# Patient Record
Sex: MALE | Race: WHITE | NOT HISPANIC OR LATINO | Employment: FULL TIME | URBAN - METROPOLITAN AREA
[De-identification: names, ages, dates, MRNs, and addresses within clinical notes are randomized per-mention and may not be internally consistent; named-entity substitution may affect disease eponyms.]

---

## 2018-11-04 ENCOUNTER — HOSPITAL ENCOUNTER (OUTPATIENT)
Facility: HOSPITAL | Age: 58
Discharge: HOME | End: 2018-11-04
Attending: EMERGENCY MEDICINE | Admitting: SURGERY
Payer: COMMERCIAL

## 2018-11-04 ENCOUNTER — ANESTHESIA (OUTPATIENT)
Dept: SURGERY | Facility: HOSPITAL | Age: 58
End: 2018-11-04
Payer: COMMERCIAL

## 2018-11-04 ENCOUNTER — ANESTHESIA EVENT (OUTPATIENT)
Dept: SURGERY | Facility: HOSPITAL | Age: 58
End: 2018-11-04
Payer: COMMERCIAL

## 2018-11-04 VITALS
OXYGEN SATURATION: 96 % | RESPIRATION RATE: 20 BRPM | DIASTOLIC BLOOD PRESSURE: 65 MMHG | HEART RATE: 67 BPM | WEIGHT: 217.38 LBS | SYSTOLIC BLOOD PRESSURE: 140 MMHG | BODY MASS INDEX: 27.9 KG/M2 | TEMPERATURE: 97 F | HEIGHT: 74 IN

## 2018-11-04 DIAGNOSIS — K82.8 BILIARY DYSKINESIA: ICD-10-CM

## 2018-11-04 DIAGNOSIS — R10.11 RUQ ABDOMINAL PAIN: ICD-10-CM

## 2018-11-04 DIAGNOSIS — K21.9 GASTROESOPHAGEAL REFLUX DISEASE WITHOUT ESOPHAGITIS: ICD-10-CM

## 2018-11-04 DIAGNOSIS — K80.50 BILIARY COLIC: Primary | ICD-10-CM

## 2018-11-04 PROBLEM — I10 ESSENTIAL HYPERTENSION: Status: ACTIVE | Noted: 2018-11-04

## 2018-11-04 PROBLEM — Z79.4 TYPE 2 DIABETES MELLITUS WITHOUT COMPLICATION, WITH LONG-TERM CURRENT USE OF INSULIN: Status: ACTIVE | Noted: 2018-11-04

## 2018-11-04 PROBLEM — R00.1 SINUS BRADYCARDIA: Status: ACTIVE | Noted: 2018-11-04

## 2018-11-04 PROBLEM — E80.6 HYPERBILIRUBINEMIA: Status: ACTIVE | Noted: 2018-11-04

## 2018-11-04 PROBLEM — E11.9 TYPE 2 DIABETES MELLITUS WITHOUT COMPLICATION, WITH LONG-TERM CURRENT USE OF INSULIN: Status: ACTIVE | Noted: 2018-11-04

## 2018-11-04 LAB
ALBUMIN SERPL BCP-MCNC: 3.8 G/DL
ALP SERPL-CCNC: 78 U/L
ALT SERPL W/O P-5'-P-CCNC: 20 U/L
ANION GAP SERPL CALC-SCNC: 9 MMOL/L
AST SERPL-CCNC: 18 U/L
BACTERIA #/AREA URNS HPF: ABNORMAL /HPF
BASOPHILS # BLD AUTO: 0.02 K/UL
BASOPHILS NFR BLD: 0.3 %
BILIRUB SERPL-MCNC: 1.3 MG/DL
BILIRUB UR QL STRIP: NEGATIVE
BUN SERPL-MCNC: 17 MG/DL
CALCIUM SERPL-MCNC: 9.3 MG/DL
CHLORIDE SERPL-SCNC: 109 MMOL/L
CLARITY UR: CLEAR
CO2 SERPL-SCNC: 19 MMOL/L
COLOR UR: YELLOW
CREAT SERPL-MCNC: 1.1 MG/DL
DIFFERENTIAL METHOD: ABNORMAL
EOSINOPHIL # BLD AUTO: 0.2 K/UL
EOSINOPHIL NFR BLD: 2.8 %
ERYTHROCYTE [DISTWIDTH] IN BLOOD BY AUTOMATED COUNT: 13.5 %
EST. GFR  (AFRICAN AMERICAN): >60 ML/MIN/1.73 M^2
EST. GFR  (NON AFRICAN AMERICAN): >60 ML/MIN/1.73 M^2
GLUCOSE SERPL-MCNC: 274 MG/DL
GLUCOSE UR QL STRIP: ABNORMAL
HCT VFR BLD AUTO: 51.8 %
HGB BLD-MCNC: 17.5 G/DL
HGB UR QL STRIP: NEGATIVE
KETONES UR QL STRIP: ABNORMAL
LEUKOCYTE ESTERASE UR QL STRIP: NEGATIVE
LIPASE SERPL-CCNC: 25 U/L
LYMPHOCYTES # BLD AUTO: 0.7 K/UL
LYMPHOCYTES NFR BLD: 11.4 %
MCH RBC QN AUTO: 30.2 PG
MCHC RBC AUTO-ENTMCNC: 33.8 G/DL
MCV RBC AUTO: 89 FL
MICROSCOPIC COMMENT: ABNORMAL
MONOCYTES # BLD AUTO: 0.8 K/UL
MONOCYTES NFR BLD: 13 %
NEUTROPHILS # BLD AUTO: 4.4 K/UL
NEUTROPHILS NFR BLD: 72 %
NITRITE UR QL STRIP: NEGATIVE
PH UR STRIP: 6 [PH] (ref 5–8)
PLATELET # BLD AUTO: 155 K/UL
PMV BLD AUTO: 10.4 FL
POTASSIUM SERPL-SCNC: 4.4 MMOL/L
PROT SERPL-MCNC: 6.5 G/DL
PROT UR QL STRIP: NEGATIVE
RBC # BLD AUTO: 5.8 M/UL
RBC #/AREA URNS HPF: 1 /HPF (ref 0–4)
SODIUM SERPL-SCNC: 137 MMOL/L
SP GR UR STRIP: 1.01 (ref 1–1.03)
SQUAMOUS #/AREA URNS HPF: 1 /HPF
URN SPEC COLLECT METH UR: ABNORMAL
UROBILINOGEN UR STRIP-ACNC: NEGATIVE EU/DL
WBC # BLD AUTO: 6.07 K/UL
WBC #/AREA URNS HPF: 0 /HPF (ref 0–5)
YEAST URNS QL MICRO: ABNORMAL

## 2018-11-04 PROCEDURE — 63600175 PHARM REV CODE 636 W HCPCS: Performed by: ANESTHESIOLOGY

## 2018-11-04 PROCEDURE — 94761 N-INVAS EAR/PLS OXIMETRY MLT: CPT

## 2018-11-04 PROCEDURE — 27000221 HC OXYGEN, UP TO 24 HOURS

## 2018-11-04 PROCEDURE — 93005 ELECTROCARDIOGRAM TRACING: CPT

## 2018-11-04 PROCEDURE — 63600175 PHARM REV CODE 636 W HCPCS: Performed by: STUDENT IN AN ORGANIZED HEALTH CARE EDUCATION/TRAINING PROGRAM

## 2018-11-04 PROCEDURE — 63600175 PHARM REV CODE 636 W HCPCS: Performed by: SURGERY

## 2018-11-04 PROCEDURE — G0378 HOSPITAL OBSERVATION PER HR: HCPCS

## 2018-11-04 PROCEDURE — 83690 ASSAY OF LIPASE: CPT

## 2018-11-04 PROCEDURE — 36000709 HC OR TIME LEV III EA ADD 15 MIN: Performed by: SURGERY

## 2018-11-04 PROCEDURE — 94799 UNLISTED PULMONARY SVC/PX: CPT

## 2018-11-04 PROCEDURE — 25000003 PHARM REV CODE 250: Performed by: STUDENT IN AN ORGANIZED HEALTH CARE EDUCATION/TRAINING PROGRAM

## 2018-11-04 PROCEDURE — 71000033 HC RECOVERY, INTIAL HOUR: Performed by: SURGERY

## 2018-11-04 PROCEDURE — 80053 COMPREHEN METABOLIC PANEL: CPT

## 2018-11-04 PROCEDURE — 85025 COMPLETE CBC W/AUTO DIFF WBC: CPT

## 2018-11-04 PROCEDURE — 27201423 OPTIME MED/SURG SUP & DEVICES STERILE SUPPLY: Performed by: SURGERY

## 2018-11-04 PROCEDURE — 25000003 PHARM REV CODE 250: Performed by: SURGERY

## 2018-11-04 PROCEDURE — 88304 TISSUE EXAM BY PATHOLOGIST: CPT | Mod: 26,,, | Performed by: PATHOLOGY

## 2018-11-04 PROCEDURE — 96375 TX/PRO/DX INJ NEW DRUG ADDON: CPT

## 2018-11-04 PROCEDURE — S0077 INJECTION, CLINDAMYCIN PHOSP: HCPCS | Performed by: STUDENT IN AN ORGANIZED HEALTH CARE EDUCATION/TRAINING PROGRAM

## 2018-11-04 PROCEDURE — 96374 THER/PROPH/DIAG INJ IV PUSH: CPT

## 2018-11-04 PROCEDURE — 93010 ELECTROCARDIOGRAM REPORT: CPT | Mod: ,,, | Performed by: INTERNAL MEDICINE

## 2018-11-04 PROCEDURE — 88304 TISSUE EXAM BY PATHOLOGIST: CPT | Performed by: PATHOLOGY

## 2018-11-04 PROCEDURE — 63600175 PHARM REV CODE 636 W HCPCS: Performed by: EMERGENCY MEDICINE

## 2018-11-04 PROCEDURE — 37000009 HC ANESTHESIA EA ADD 15 MINS: Performed by: SURGERY

## 2018-11-04 PROCEDURE — 81000 URINALYSIS NONAUTO W/SCOPE: CPT

## 2018-11-04 PROCEDURE — 36000708 HC OR TIME LEV III 1ST 15 MIN: Performed by: SURGERY

## 2018-11-04 PROCEDURE — 25000003 PHARM REV CODE 250: Performed by: EMERGENCY MEDICINE

## 2018-11-04 PROCEDURE — 99285 EMERGENCY DEPT VISIT HI MDM: CPT | Mod: 25

## 2018-11-04 PROCEDURE — 63600175 PHARM REV CODE 636 W HCPCS

## 2018-11-04 PROCEDURE — 37000008 HC ANESTHESIA 1ST 15 MINUTES: Performed by: SURGERY

## 2018-11-04 RX ORDER — GLYCOPYRROLATE 0.2 MG/ML
INJECTION INTRAMUSCULAR; INTRAVENOUS
Status: DISCONTINUED | OUTPATIENT
Start: 2018-11-04 | End: 2018-11-04

## 2018-11-04 RX ORDER — FENTANYL CITRATE 50 UG/ML
INJECTION, SOLUTION INTRAMUSCULAR; INTRAVENOUS
Status: DISCONTINUED | OUTPATIENT
Start: 2018-11-04 | End: 2018-11-04

## 2018-11-04 RX ORDER — ONDANSETRON 2 MG/ML
4 INJECTION INTRAMUSCULAR; INTRAVENOUS
Status: COMPLETED | OUTPATIENT
Start: 2018-11-04 | End: 2018-11-04

## 2018-11-04 RX ORDER — GLIPIZIDE 10 MG/1
10 TABLET ORAL
COMMUNITY

## 2018-11-04 RX ORDER — SODIUM CHLORIDE 0.9 % (FLUSH) 0.9 %
3 SYRINGE (ML) INJECTION
Status: DISCONTINUED | OUTPATIENT
Start: 2018-11-04 | End: 2018-11-04 | Stop reason: HOSPADM

## 2018-11-04 RX ORDER — SODIUM CHLORIDE 0.9 % (FLUSH) 0.9 %
3 SYRINGE (ML) INJECTION EVERY 8 HOURS
Status: DISCONTINUED | OUTPATIENT
Start: 2018-11-04 | End: 2018-11-04 | Stop reason: HOSPADM

## 2018-11-04 RX ORDER — PROPOFOL 10 MG/ML
VIAL (ML) INTRAVENOUS
Status: DISCONTINUED | OUTPATIENT
Start: 2018-11-04 | End: 2018-11-04

## 2018-11-04 RX ORDER — ACETAMINOPHEN 325 MG/1
650 TABLET ORAL EVERY 8 HOURS PRN
Status: DISCONTINUED | OUTPATIENT
Start: 2018-11-04 | End: 2018-11-04 | Stop reason: HOSPADM

## 2018-11-04 RX ORDER — ACETAMINOPHEN 325 MG/1
650 TABLET ORAL EVERY 4 HOURS PRN
Status: DISCONTINUED | OUTPATIENT
Start: 2018-11-04 | End: 2018-11-04 | Stop reason: HOSPADM

## 2018-11-04 RX ORDER — ONDANSETRON 2 MG/ML
4 INJECTION INTRAMUSCULAR; INTRAVENOUS ONCE AS NEEDED
Status: DISCONTINUED | OUTPATIENT
Start: 2018-11-04 | End: 2018-11-04 | Stop reason: HOSPADM

## 2018-11-04 RX ORDER — SODIUM CHLORIDE 9 MG/ML
INJECTION, SOLUTION INTRAVENOUS CONTINUOUS PRN
Status: DISCONTINUED | OUTPATIENT
Start: 2018-11-04 | End: 2018-11-04

## 2018-11-04 RX ORDER — SUCCINYLCHOLINE CHLORIDE 20 MG/ML
INJECTION INTRAMUSCULAR; INTRAVENOUS
Status: DISCONTINUED | OUTPATIENT
Start: 2018-11-04 | End: 2018-11-04

## 2018-11-04 RX ORDER — ROCURONIUM BROMIDE 10 MG/ML
INJECTION, SOLUTION INTRAVENOUS
Status: DISCONTINUED | OUTPATIENT
Start: 2018-11-04 | End: 2018-11-04

## 2018-11-04 RX ORDER — OMEPRAZOLE 40 MG/1
40 CAPSULE, DELAYED RELEASE ORAL DAILY
COMMUNITY

## 2018-11-04 RX ORDER — HYDROMORPHONE HYDROCHLORIDE 2 MG/ML
0.5 INJECTION, SOLUTION INTRAMUSCULAR; INTRAVENOUS; SUBCUTANEOUS EVERY 6 HOURS PRN
Status: DISCONTINUED | OUTPATIENT
Start: 2018-11-04 | End: 2018-11-04 | Stop reason: HOSPADM

## 2018-11-04 RX ORDER — NEBIVOLOL 10 MG/1
10 TABLET ORAL DAILY
COMMUNITY

## 2018-11-04 RX ORDER — HYDROMORPHONE HYDROCHLORIDE 2 MG/ML
0.5 INJECTION, SOLUTION INTRAMUSCULAR; INTRAVENOUS; SUBCUTANEOUS EVERY 5 MIN PRN
Status: DISCONTINUED | OUTPATIENT
Start: 2018-11-04 | End: 2018-11-04 | Stop reason: HOSPADM

## 2018-11-04 RX ORDER — SUCRALFATE 1 G/1
1 TABLET ORAL 4 TIMES DAILY
COMMUNITY

## 2018-11-04 RX ORDER — KETOROLAC TROMETHAMINE 30 MG/ML
INJECTION, SOLUTION INTRAMUSCULAR; INTRAVENOUS
Status: DISCONTINUED | OUTPATIENT
Start: 2018-11-04 | End: 2018-11-04

## 2018-11-04 RX ORDER — METFORMIN HYDROCHLORIDE 500 MG/1
500 TABLET ORAL
COMMUNITY

## 2018-11-04 RX ORDER — HYDRALAZINE HYDROCHLORIDE 20 MG/ML
INJECTION INTRAMUSCULAR; INTRAVENOUS
Status: COMPLETED
Start: 2018-11-04 | End: 2018-11-04

## 2018-11-04 RX ORDER — MIDAZOLAM HYDROCHLORIDE 1 MG/ML
INJECTION, SOLUTION INTRAMUSCULAR; INTRAVENOUS
Status: DISCONTINUED | OUTPATIENT
Start: 2018-11-04 | End: 2018-11-04

## 2018-11-04 RX ORDER — OXYCODONE HYDROCHLORIDE 5 MG/1
5 TABLET ORAL EVERY 4 HOURS PRN
Status: DISCONTINUED | OUTPATIENT
Start: 2018-11-04 | End: 2018-11-04 | Stop reason: HOSPADM

## 2018-11-04 RX ORDER — MORPHINE SULFATE 2 MG/ML
6 INJECTION, SOLUTION INTRAMUSCULAR; INTRAVENOUS
Status: COMPLETED | OUTPATIENT
Start: 2018-11-04 | End: 2018-11-04

## 2018-11-04 RX ORDER — OXYCODONE AND ACETAMINOPHEN 5; 325 MG/1; MG/1
1 TABLET ORAL EVERY 4 HOURS PRN
Qty: 25 TABLET | Refills: 0 | Status: SHIPPED | OUTPATIENT
Start: 2018-11-04

## 2018-11-04 RX ORDER — EPHEDRINE SULFATE 50 MG/ML
INJECTION, SOLUTION INTRAVENOUS
Status: DISCONTINUED | OUTPATIENT
Start: 2018-11-04 | End: 2018-11-04

## 2018-11-04 RX ORDER — HEPARIN SODIUM 5000 [USP'U]/ML
5000 INJECTION, SOLUTION INTRAVENOUS; SUBCUTANEOUS ONCE
Status: COMPLETED | OUTPATIENT
Start: 2018-11-04 | End: 2018-11-04

## 2018-11-04 RX ORDER — NEOSTIGMINE METHYLSULFATE 1 MG/ML
INJECTION, SOLUTION INTRAVENOUS
Status: DISCONTINUED | OUTPATIENT
Start: 2018-11-04 | End: 2018-11-04

## 2018-11-04 RX ORDER — RAMELTEON 8 MG/1
8 TABLET ORAL NIGHTLY PRN
Status: DISCONTINUED | OUTPATIENT
Start: 2018-11-04 | End: 2018-11-04 | Stop reason: HOSPADM

## 2018-11-04 RX ORDER — ONDANSETRON 2 MG/ML
INJECTION INTRAMUSCULAR; INTRAVENOUS
Status: DISCONTINUED | OUTPATIENT
Start: 2018-11-04 | End: 2018-11-04

## 2018-11-04 RX ORDER — ONDANSETRON 8 MG/1
8 TABLET, ORALLY DISINTEGRATING ORAL EVERY 8 HOURS PRN
Status: DISCONTINUED | OUTPATIENT
Start: 2018-11-04 | End: 2018-11-04 | Stop reason: HOSPADM

## 2018-11-04 RX ORDER — BUPIVACAINE HYDROCHLORIDE 2.5 MG/ML
INJECTION, SOLUTION EPIDURAL; INFILTRATION; INTRACAUDAL
Status: DISCONTINUED | OUTPATIENT
Start: 2018-11-04 | End: 2018-11-04 | Stop reason: HOSPADM

## 2018-11-04 RX ORDER — LISINOPRIL 40 MG/1
40 TABLET ORAL DAILY
COMMUNITY

## 2018-11-04 RX ORDER — SIMVASTATIN 20 MG/1
20 TABLET, FILM COATED ORAL NIGHTLY
COMMUNITY

## 2018-11-04 RX ORDER — HYDRALAZINE HYDROCHLORIDE 20 MG/ML
10 INJECTION INTRAMUSCULAR; INTRAVENOUS ONCE
Status: COMPLETED | OUTPATIENT
Start: 2018-11-04 | End: 2018-11-04

## 2018-11-04 RX ORDER — OXYCODONE HYDROCHLORIDE 5 MG/1
10 TABLET ORAL EVERY 4 HOURS PRN
Status: DISCONTINUED | OUTPATIENT
Start: 2018-11-04 | End: 2018-11-04 | Stop reason: HOSPADM

## 2018-11-04 RX ORDER — KETOROLAC TROMETHAMINE 30 MG/ML
15 INJECTION, SOLUTION INTRAMUSCULAR; INTRAVENOUS EVERY 6 HOURS
Status: DISCONTINUED | OUTPATIENT
Start: 2018-11-04 | End: 2018-11-04 | Stop reason: HOSPADM

## 2018-11-04 RX ORDER — CLINDAMYCIN PHOSPHATE 900 MG/50ML
900 INJECTION, SOLUTION INTRAVENOUS
Status: DISCONTINUED | OUTPATIENT
Start: 2018-11-04 | End: 2018-11-04

## 2018-11-04 RX ORDER — LIDOCAINE HCL/PF 100 MG/5ML
SYRINGE (ML) INTRAVENOUS
Status: DISCONTINUED | OUTPATIENT
Start: 2018-11-04 | End: 2018-11-04

## 2018-11-04 RX ORDER — ONDANSETRON 4 MG/1
4 TABLET, ORALLY DISINTEGRATING ORAL EVERY 8 HOURS PRN
Qty: 20 TABLET | Refills: 0 | Status: SHIPPED | OUTPATIENT
Start: 2018-11-04

## 2018-11-04 RX ORDER — ACETAMINOPHEN 10 MG/ML
INJECTION, SOLUTION INTRAVENOUS
Status: DISCONTINUED | OUTPATIENT
Start: 2018-11-04 | End: 2018-11-04

## 2018-11-04 RX ADMIN — FENTANYL CITRATE 50 MCG: 50 INJECTION, SOLUTION INTRAMUSCULAR; INTRAVENOUS at 12:11

## 2018-11-04 RX ADMIN — SODIUM CHLORIDE: 0.9 INJECTION, SOLUTION INTRAVENOUS at 11:11

## 2018-11-04 RX ADMIN — HYDROMORPHONE HYDROCHLORIDE 0.5 MG: 2 INJECTION INTRAMUSCULAR; INTRAVENOUS; SUBCUTANEOUS at 01:11

## 2018-11-04 RX ADMIN — MORPHINE SULFATE 6 MG: 2 INJECTION, SOLUTION INTRAMUSCULAR; INTRAVENOUS at 04:11

## 2018-11-04 RX ADMIN — GLYCOPYRROLATE 0.6 MG: 0.2 INJECTION, SOLUTION INTRAMUSCULAR; INTRAVENOUS at 12:11

## 2018-11-04 RX ADMIN — EPHEDRINE SULFATE 10 MG: 50 INJECTION, SOLUTION INTRAMUSCULAR; INTRAVENOUS; SUBCUTANEOUS at 11:11

## 2018-11-04 RX ADMIN — ROCURONIUM BROMIDE 10 MG: 10 INJECTION, SOLUTION INTRAVENOUS at 12:11

## 2018-11-04 RX ADMIN — ACETAMINOPHEN 1000 MG: 10 INJECTION, SOLUTION INTRAVENOUS at 11:11

## 2018-11-04 RX ADMIN — LIDOCAINE HYDROCHLORIDE 100 MG: 20 INJECTION, SOLUTION INTRAVENOUS at 11:11

## 2018-11-04 RX ADMIN — PROPOFOL 160 MG: 10 INJECTION, EMULSION INTRAVENOUS at 11:11

## 2018-11-04 RX ADMIN — MIDAZOLAM 2 MG: 1 INJECTION INTRAMUSCULAR; INTRAVENOUS at 11:11

## 2018-11-04 RX ADMIN — ROCURONIUM BROMIDE 5 MG: 10 INJECTION, SOLUTION INTRAVENOUS at 11:11

## 2018-11-04 RX ADMIN — FENTANYL CITRATE 150 MCG: 50 INJECTION, SOLUTION INTRAMUSCULAR; INTRAVENOUS at 11:11

## 2018-11-04 RX ADMIN — GLYCOPYRROLATE 0.1 MG: 0.2 INJECTION, SOLUTION INTRAMUSCULAR; INTRAVENOUS at 11:11

## 2018-11-04 RX ADMIN — HYDRALAZINE HYDROCHLORIDE 10 MG: 20 INJECTION INTRAMUSCULAR; INTRAVENOUS at 01:11

## 2018-11-04 RX ADMIN — SUCCINYLCHOLINE CHLORIDE 120 MG: 20 INJECTION, SOLUTION INTRAMUSCULAR; INTRAVENOUS at 11:11

## 2018-11-04 RX ADMIN — KETOROLAC TROMETHAMINE 30 MG: 30 INJECTION, SOLUTION INTRAMUSCULAR; INTRAVENOUS at 12:11

## 2018-11-04 RX ADMIN — HEPARIN SODIUM 5000 UNITS: 5000 INJECTION, SOLUTION INTRAVENOUS; SUBCUTANEOUS at 10:11

## 2018-11-04 RX ADMIN — ONDANSETRON 4 MG: 2 INJECTION INTRAMUSCULAR; INTRAVENOUS at 04:11

## 2018-11-04 RX ADMIN — GLYCOPYRROLATE 0.1 MG: 0.2 INJECTION, SOLUTION INTRAMUSCULAR; INTRAVENOUS at 12:11

## 2018-11-04 RX ADMIN — LIDOCAINE HYDROCHLORIDE: 20 SOLUTION ORAL; TOPICAL at 06:11

## 2018-11-04 RX ADMIN — CLINDAMYCIN PHOSPHATE 900 MG: 18 INJECTION, SOLUTION INTRAVENOUS at 11:11

## 2018-11-04 RX ADMIN — NEOSTIGMINE METHYLSULFATE 3 MG: 1 INJECTION INTRAVENOUS at 12:11

## 2018-11-04 RX ADMIN — ONDANSETRON 4 MG: 2 INJECTION, SOLUTION INTRAMUSCULAR; INTRAVENOUS at 12:11

## 2018-11-04 RX ADMIN — KETOROLAC TROMETHAMINE 15 MG: 30 INJECTION, SOLUTION INTRAMUSCULAR at 05:11

## 2018-11-04 RX ADMIN — ROCURONIUM BROMIDE 25 MG: 10 INJECTION, SOLUTION INTRAVENOUS at 11:11

## 2018-11-04 NOTE — ED NOTES
Report received from ariadna rodriguez patient awake alert in no acute distress surgeon at bedside patient being consented for surgery by dr. Lay patient has no questions at this time patient updated on plan of care nurse will continue to monitor

## 2018-11-04 NOTE — TRANSFER OF CARE
"Anesthesia Transfer of Care Note    Patient: Brandt Sanches    Procedure(s) Performed: Procedure(s) (LRB):  CHOLECYSTECTOMY, LAPAROSCOPIC (N/A)    Patient location: ICU    Anesthesia Type: general    Transport from OR: Transported from OR on 6-10 L/min O2 by face mask with adequate spontaneous ventilation    Post pain: adequate analgesia    Post assessment: no apparent anesthetic complications    Post vital signs: stable    Level of consciousness: awake and alert    Nausea/Vomiting: no nausea/vomiting    Complications: none    Transfer of care protocol was followed      Last vitals:   Visit Vitals  BP (!) 193/86   Pulse (!) 54   Temp 36.3 °C (97.3 °F) (Skin)   Resp 20   Ht 6' 2" (1.88 m)   Wt 98.6 kg (217 lb 6 oz)   SpO2 98%   BMI 27.91 kg/m²     "

## 2018-11-04 NOTE — OP NOTE
[facility]  Surgery Department  Operative Note    SUMMARY     Date of Procedure: 11/4/2018     Procedure: Procedure(s) (LRB):  CHOLECYSTECTOMY, LAPAROSCOPIC (N/A)     Surgeon(s) and Role:     * Marla Geiger MD - Primary    Assisting Surgeon: dr. Mathieu franklin    Pre-Operative Diagnosis: Biliary colic [K80.50]  Biliary dyskinesia [K82.8]    Post-Operative Diagnosis: Post-Op Diagnosis Codes:     * Biliary colic [K80.50]     * Biliary dyskinesia [K82.8]  With acute cholecystitis    Anesthesia: General    Technical Procedures Used: 4 port lap kameron    Description of the Findings of the Procedure: acute cholecystitis  [id number]    Complications: no    Estimated Blood Loss (EBL): minimal         Implants: none  Specimens:   Specimen (12h ago, onward)    Start     Ordered    11/04/18 1248  Specimen to Pathology - Surgery  Once     Comments:  Pre-op Diagnosis: Biliary colic [K80.50]Biliary dyskinesia [K82.8]Post-op Diagnosis: same as preopProcedure(s):CHOLECYSTECTOMY, LAPAROSCOPIC Number of specimens:1Name of specimens: Gallbladder     Start Status   11/04/18 1248 Collected (11/04/18 1248)       11/04/18 1248                  Condition: Stable    Disposition: PACU - hemodynamically stable.    Attestation: I was present and scrubbed for the entire procedure.

## 2018-11-04 NOTE — NURSING
Admit assessment questions completed.  Patient answered all questions appropriately.  Denies any questions, concerns or needs at this time.  Waiting OR for cholecystectomy.  Will continue to monitor.

## 2018-11-04 NOTE — ANESTHESIA PREPROCEDURE EVALUATION
11/04/2018  Brandt Sanches is a 58 y.o., male with pmhx of HTN, GERD, DM2 scheduled for laparoscopic cholecystectomy. Hx of previous GETA for knee arthroplasty in 2012 in [place] with [facility]. NPO since evening PTA    Anesthesia Evaluation    I have reviewed the Patient Summary Reports.    I have reviewed the Nursing Notes.      Review of Systems  Anesthesia Hx:  History of prior surgery of interest to airway management or planning: Denies Family Hx of Anesthesia complications.   Denies Personal Hx of Anesthesia complications.   Social:  Non-Smoker    Hematology/Oncology:  Hematology Normal   Oncology Normal     EENT/Dental:EENT/Dental Normal   Cardiovascular:   Hypertension    Pulmonary:  Pulmonary Normal    Hepatic/GI:   GERD    Neurological:  Neurology Normal    Endocrine:   Diabetes        Physical Exam   Airway/Jaw/Neck:  Airway Findings: Mouth Opening: Normal Tongue: Normal  Mallampati: II  TM Distance: Normal, at least 6 cm  Jaw/Neck Findings:  Neck ROM: Normal ROM      Dental:  Dental Findings: upper partial dentures   Chest/Lungs:  Chest/Lungs Findings: Clear to auscultation     Heart/Vascular:  Heart Findings: Rate: Bradycardia  Rhythm: Regular Rhythm             Anesthesia Plan  Type of Anesthesia, risks & benefits discussed:  Anesthesia Type:  general  Patient's Preference:   Intra-op Monitoring Plan: standard ASA monitors  Intra-op Monitoring Plan Comments:   Post Op Pain Control Plan: multimodal analgesia  Post Op Pain Control Plan Comments:   Induction:   IV  Beta Blocker:  Patient is not currently on a Beta-Blocker (No further documentation required).       Informed Consent: Patient understands risks and agrees with Anesthesia plan.  Questions answered. Anesthesia consent signed with patient.  ASA Score: 2     Day of Surgery Review of History & Physical:            Ready For Surgery From Anesthesia  Perspective.

## 2018-11-04 NOTE — ED NOTES
PT PRESENTS TO ED WITH GENERALIZED ABDOMINAL PAIN AND TENDERNESS THAT IS WORSE IN THE RUQ AND WORSENS AFTER EATING. PT REPORTS +N/V/D. STATES HE HAD A HIDA SCAN RECENTLY AND WAS TOLD HE NEEDED TO HAVE A CHOLECYSTECTOMY AND WAS IN THE PROCESS OF SCHEDULING THE SURGERY, BUT HE CAME FROM OUT OF STATE TO VISIT HIS SON AND PAIN WORSENED AROUND 0000 THIS MORNING. PT REPORTS HE HAD A RECENT GALLBLADDER US BUT THAT ONE WAS NEGATIVE.

## 2018-11-04 NOTE — ED PROVIDER NOTES
Encounter Date: 11/4/2018    SCRIBE #1 NOTE: I, Chelsey Montano, am scribing for, and in the presence of,  Dr. Stephenson. I have scribed the entire note.       History     Chief Complaint   Patient presents with    Abdominal Pain     To ER with c/o nausea, vomiting, diarrhea and abd pain starting at midnight.  pt recently diagnosed with decreased functioning of gallbladder.    Nausea    Diarrhea    Vomiting     A 57 y/o male presents to the ED complaining of RUQ abdominal pain with episodes of emesis and diarrhea that worsened at 2400. Patient from [place], reports recent HIDA scan with EF of 8%. He is scheduled for follow up on 11/15 for evaluation for possible cholecystectomy . Patient describes sharp cramping abdominal pain that is radiating to his back. Abdominal pain worse after eating without any reported allleviating factors since onset. Assocaited symptoms include subjective fever, headache, and sore throat. He denies any dysuria, hematuria, CP, SOB, dizziness, cough, congestion, or any other concerning symptoms.      The history is provided by the patient.     Review of patient's allergies indicates:   Allergen Reactions    Iodine and iodide containing products Nausea And Vomiting    Pcn [penicillins] Rash     Past Medical History:   Diagnosis Date    Diabetes mellitus      Past Surgical History:   Procedure Laterality Date    CARPAL TUNNEL RELEASE Left     CHOLECYSTECTOMY, LAPAROSCOPIC N/A 11/4/2018    Performed by Marla Geiger MD at [facility] OR    LAPAROSCOPIC CHOLECYSTECTOMY N/A 11/4/2018    Procedure: CHOLECYSTECTOMY, LAPAROSCOPIC;  Surgeon: Marla Geiger MD;  Location: [facility] OR;  Service: General;  Laterality: N/A;    TOTAL KNEE ARTHROPLASTY Left      History reviewed. No pertinent family history.  Social History     Tobacco Use    Smoking status: Never Smoker   Substance Use Topics    Alcohol use: No     Frequency: Never    Drug use: No     Review of Systems   Constitutional:  Positive for fever. Negative for chills.   HENT: Positive for sore throat. Negative for congestion, ear pain and rhinorrhea.    Respiratory: Negative for cough, shortness of breath and wheezing.    Cardiovascular: Negative for chest pain and palpitations.   Gastrointestinal: Positive for abdominal pain, diarrhea, nausea and vomiting.   Genitourinary: Negative for dysuria and hematuria.   Musculoskeletal: Positive for back pain. Negative for myalgias and neck pain.   Skin: Negative for rash.   Neurological: Positive for headaches. Negative for dizziness, weakness and light-headedness.   Psychiatric/Behavioral: Negative for confusion.       Physical Exam     Initial Vitals [11/04/18 0354]   BP Pulse Resp Temp SpO2   (!) 159/80 65 18 98.1 °F (36.7 °C) 97 %      MAP       --         Physical Exam    Nursing note and vitals reviewed.  Constitutional: He appears well-developed and well-nourished.  Non-toxic appearance. He does not appear ill. No distress.   HENT:   Head: Normocephalic and atraumatic.   Mouth/Throat: Oropharynx is clear and moist.   Eyes: Conjunctivae and EOM are normal.   Cardiovascular: Normal rate, regular rhythm, normal heart sounds and intact distal pulses.   Pulmonary/Chest: Breath sounds normal. No respiratory distress.   Abdominal: Soft. Normal appearance and bowel sounds are normal. There is tenderness in the right upper quadrant. There is guarding. There is no rebound.   Diffuse tenderness RUQ with voluntary guarding    Musculoskeletal: Normal range of motion. He exhibits no edema or tenderness.   Neurological: He is alert and oriented to person, place, and time. He has normal strength.   Skin: Skin is warm and dry. Capillary refill takes less than 2 seconds.         ED Course   Procedures  Labs Reviewed   CBC W/ AUTO DIFFERENTIAL - Abnormal; Notable for the following components:       Result Value    Lymph # 0.7 (*)     Lymph% 11.4 (*)     All other components within normal limits    COMPREHENSIVE METABOLIC PANEL - Abnormal; Notable for the following components:    CO2 19 (*)     Glucose 274 (*)     Total Bilirubin 1.3 (*)     All other components within normal limits   URINALYSIS, REFLEX TO URINE CULTURE - Abnormal; Notable for the following components:    Glucose, UA 3+ (*)     Ketones, UA Trace (*)     All other components within normal limits    Narrative:     Preferred Collection Type->Urine, Clean Catch   URINALYSIS MICROSCOPIC - Abnormal; Notable for the following components:    Bacteria, UA Few (*)     All other components within normal limits    Narrative:     Preferred Collection Type->Urine, Clean Catch   LIPASE          X-Rays:   Independently Interpreted Readings:   Other Readings:  Reviewed by myself, read by radiology.      Imaging Results          X-Ray Chest 1 View (Final result)  Result time 11/04/18 08:11:36    Final result by Toni Ruiz III, MD (11/04/18 08:11:36)                 Impression:      See above    No acute process seen.      Electronically signed by: Toni Ruiz MD  Date:    11/04/2018  Time:    08:11             Narrative:    EXAMINATION:  XR CHEST 1 VIEW    CLINICAL HISTORY:  pre-op;    FINDINGS:  Heart size is normal.  Lungs are clear.  There is aortic plaque.                               US Abdomen Limited (Final result)  Result time 11/04/18 05:39:33    Final result by Stephie Lou MD (11/04/18 05:39:33)                 Impression:      No significant sonographic abnormality.      Electronically signed by: Stephie Lou MD  Date:    11/04/2018  Time:    05:39             Narrative:    EXAMINATION:  US ABDOMEN LIMITED    CLINICAL HISTORY:  Abdominal Pain - Gallbladder;    TECHNIQUE:  Limited ultrasound of the right upper quadrant of the abdomen (including pancreas, liver, gallbladder, common bile duct, and right kidney) was performed.    COMPARISON:  None.    FINDINGS:  Pancreas: Unremarkable in its visualized extent noting partial obscuration by  overlying bowel gas.    Liver: The liver measures 13.5 cm.  Homogeneous echotexture. No focal hepatic lesions.    Gallbladder: No calculi, wall thickening, or pericholecystic fluid.  No sonographic Brooke's sign.    Biliary system: The common duct is not dilated, measuring 0.3 cm.  No intrahepatic ductal dilatation.    Right kidney: Normal in size with no hydronephrosis, measuring 12.4 cm.    Miscellaneous: No upper abdominal ascites.                               Medical Decision Making:   Initial Assessment:   A 59 y/o male presents to the ED complaining of RUQ abdominal pain with episodes of emesis and diarrhea that worsened at 2400.  Differential Diagnosis:   Gastroenteritis, gastritis, ulcer, cholecystitis, gallstones, pancreatitis, ileus, small bowel obstruction, appendicitis, constipation.     Clinical Tests:   Lab Tests: Reviewed and Ordered  Radiological Study: Ordered and Reviewed  Medical Tests: Ordered and Reviewed  ED Management:  Patient continues to have pain on repeat abdominal exam.  General surgery consulted for evaluation                   ED Course as of Nov 07 0635   Sun Nov 04, 2018   0447 EKG with sinus bradycardia.  Flattened T waves in lead V1 and inferiorly.  Normal conduction, normal intervals.  No STEMI  [LD]   0615 Patient  with voluntary guarding on abdominal exam.    [LD]   0627 General surgery will evaluate patient in ED  [LD]      ED Course User Index  [LD] Candie Stephenson MD     Clinical Impression:     1. Biliary colic    2. RUQ abdominal pain    3. Biliary dyskinesia    4. Gastroesophageal reflux disease without esophagitis         I, Candie Stephenson,  personally performed the services described in this documentation. All medical record entries made by the scribe were at my direction and in my presence.  I have reviewed the chart and agree that the record reflects my personal performance and is accurate and complete. Candie Stephenson M.D. 6:35  AM11/07/2018                   Candie Stephenson MD  11/07/18 0635

## 2018-11-04 NOTE — NURSING
Reviewed discharge instructions with patient and spouse.  Patient and spouse verbalized understanding using teachback method.  All questions answered. Also educated on the proper use of the incentive spirometry, patient returned proper demonstration.  Explained the splinting process as well, verbalized understanding. Waiting transport.  Will continue to monitor.

## 2018-11-04 NOTE — PROGRESS NOTES
Patient returns to floor from PACU.  Patient is groggy but arousable.  Patient denies pain at this time.  Puncture sites X4 intact with Dermabond.  Family members at bedside at this time.  Instructed patient and family members that patient can be discharged if he urinates.  Patient verbalized complete understanding.  Safety maintained.  Will continue to monitor.

## 2018-11-04 NOTE — H&P
History & Physical  General Surgery      SUBJECTIVE:     Chief Complaint/Reason for Admission: RUQ pain    History of Present Illness: Brandt Sanches is a 58 y.o. male with a PMH of DM, HTN, GERD, and reported biliary dyskinesia with recent HIDA showing gallbladder EF <10% who presents to the ED with worsening RUQ pain and nausea. The patient states that the pain has been present for the past few months, but has worsened over the past week, becoming unbearable overnight. He denies fevers, chills, chest pain, SOB, constipation or blood in the urine or stool but does report some nausea and occasional diarrhea. His pain is located in the epigastric region and RUQ with radiation around to the back and is dull achy becoming sharp and stabbing after ingesting food with resolution about an hour later. The pain is otherwise intermittent with no other precipitating or relieving factors. He is on a PPI for his GERD with minimal resolution of his pain after GI cocktail in the ER. US and lab evaluation notable only for mild hyperbilirubinemia. Surgery consulted for further evaluation.       No current facility-administered medications on file prior to encounter.      Current Outpatient Medications on File Prior to Encounter   Medication Sig    canagliflozin (INVOKANA) 300 mg Tab tablet Take 300 mg by mouth once daily.    glipiZIDE (GLUCOTROL) 10 MG tablet Take 10 mg by mouth 2 (two) times daily before meals.    insulin glargine,hum.rec.anlog (LANTUS U-100 INSULIN SUBQ) Inject into the skin.    lisinopril (PRINIVIL,ZESTRIL) 40 MG tablet Take 40 mg by mouth once daily.    metFORMIN (GLUCOPHAGE) 500 MG tablet Take 500 mg by mouth 2 (two) times daily with meals.    nebivolol (BYSTOLIC) 10 MG Tab Take 10 mg by mouth once daily.    omeprazole (PRILOSEC) 40 MG capsule Take 40 mg by mouth once daily.    simvastatin (ZOCOR) 20 MG tablet Take 20 mg by mouth every evening.    sucralfate (CARAFATE) 1 gram tablet Take 1 g by mouth 4  (four) times daily.       Review of patient's allergies indicates:   Allergen Reactions    Iodine and iodide containing products Nausea And Vomiting    Pcn [penicillins] Rash       Past Medical History:   Diagnosis Date    Diabetes mellitus      Past Surgical History:   Procedure Laterality Date    CARPAL TUNNEL RELEASE Left     TOTAL KNEE ARTHROPLASTY Left      No family history on file.  Social History     Tobacco Use    Smoking status: Never Smoker   Substance Use Topics    Alcohol use: No     Frequency: Never    Drug use: No        Review of Systems   Constitutional: Positive for appetite change. Negative for activity change and fever.   HENT: Negative for rhinorrhea, sneezing and trouble swallowing.    Eyes: Negative for visual disturbance.   Respiratory: Negative for cough and shortness of breath.    Cardiovascular: Negative for chest pain, palpitations and leg swelling.   Gastrointestinal: Positive for abdominal pain and diarrhea. Negative for abdominal distention, blood in stool, constipation, nausea and vomiting.   Genitourinary: Negative for flank pain and hematuria.   Musculoskeletal: Positive for back pain.   Skin: Negative for color change and wound.   Allergic/Immunologic: Negative for immunocompromised state.   Neurological: Positive for headaches. Negative for syncope and weakness.   Hematological: Does not bruise/bleed easily.   Psychiatric/Behavioral: Negative for agitation.     OBJECTIVE:     Vital Signs (Most Recent)  Temp: 98.1 °F (36.7 °C) (11/04/18 0354)  Pulse: (!) 51 (11/04/18 0632)  Resp: 15 (11/04/18 0632)  BP: 139/67 (11/04/18 0632)  SpO2: 96 % (11/04/18 0632)    Physical Exam   Constitutional: He is oriented to person, place, and time. He appears well-developed and well-nourished. No distress.   HENT:   Head: Normocephalic and atraumatic.   Eyes: EOM are normal. Pupils are equal, round, and reactive to light. No scleral icterus.   Neck: Neck supple. No tracheal deviation present.    Cardiovascular: Regular rhythm and intact distal pulses.   Sinus bradycardia   Pulmonary/Chest: Effort normal. No respiratory distress.   Abdominal: Soft. He exhibits no distension. There is tenderness (diffuse, maximal RUQ, - [place]). There is rebound and guarding.   Musculoskeletal: Normal range of motion.   Neurological: He is alert and oriented to person, place, and time.   Skin: Skin is warm and dry.   Psychiatric: He has a normal mood and affect.     Laboratory  CBC:   Recent Labs   Lab 11/04/18  0415   WBC 6.07   RBC 5.80   HGB 17.5   HCT 51.8      MCV 89   MCH 30.2   MCHC 33.8     CMP:   Recent Labs   Lab 11/04/18  0415   *   CALCIUM 9.3   ALBUMIN 3.8   PROT 6.5      K 4.4   CO2 19*      BUN 17   CREATININE 1.1   ALKPHOS 78   ALT 20   AST 18   BILITOT 1.3*     Labs reviewed    Diagnostic Results:  Labs: Reviewed  ECG: Reviewed  US: Reviewed    ASSESSMENT/PLAN:     A/P: 58m with biliary colic and biliary dyskinesia    - R/B/A to lap vs open cholecystectomy explained in legnth, questions answered, consent signed  - Plan to admit  - NPO  - Pain and nausea control  - Will plan on lap kameron  - CLD, ADAT post-op  - May be stable for discharge later today vs tomorrow    Elías Lay MD  Neuroendocrine Surgery  P: [phone]

## 2018-11-04 NOTE — PROVIDER PROGRESS NOTES - EMERGENCY DEPT.
Encounter Date: 11/4/2018    ED Physician Progress Notes       SCRIBE NOTE: I, Brit Osorio, am scribing for, and in the presence of,  Dr. Gutierrez.  Physician Statement: I, Dr. Gutierrez, personally performed the services described in this documentation as scribed by Brit Osorio in my presence, and it is both accurate and complete.        7:43 AM: [facility] surgery has evaluated the pt and will admit for cholecystectomy.

## 2018-11-04 NOTE — ANESTHESIA POSTPROCEDURE EVALUATION
"Anesthesia Post Evaluation    Patient: Brandt Sanches    Procedure(s) Performed: Procedure(s) (LRB):  CHOLECYSTECTOMY, LAPAROSCOPIC (N/A)    Final Anesthesia Type: general  Patient location during evaluation: PACU  Patient participation: Yes- Able to Participate  Level of consciousness: awake and alert  Post-procedure vital signs: reviewed and stable  Pain management: adequate  Airway patency: patent  PONV status at discharge: No PONV  Anesthetic complications: no      Cardiovascular status: blood pressure returned to baseline  Respiratory status: unassisted  Hydration status: euvolemic  Follow-up not needed.        Visit Vitals  BP (!) 140/65 (BP Location: Left arm, Patient Position: Lying)   Pulse 67   Temp 35.9 °C (96.6 °F) (Oral)   Resp 20   Ht 6' 2" (1.88 m)   Wt 98.6 kg (217 lb 6 oz)   SpO2 96%   BMI 27.91 kg/m²       Pain/Milli Score: Pain Assessment Performed: Yes (11/4/2018  5:40 PM)  Presence of Pain: denies (11/4/2018  5:40 PM)  Pain Rating Prior to Med Admin: 3 (11/4/2018  5:06 PM)  Milli Score: 8 (11/4/2018  2:07 PM)        "

## 2018-11-04 NOTE — PROGRESS NOTES
Patient is awake, alert, and oriented.  Patient is resting in bed at this time with very mild and tolerable pain.  Spouse is at bedside.  Instructed on Hibiclens bath.  Safety is maintained with bed low, wheels locked and side rails up.  Call light within reach.  Instructed to call for any assistance.  Will continue to monitor.

## 2018-11-04 NOTE — PLAN OF CARE
Discharge orders noted, no HH or HME ordered.    Pt's nurse will go over medications/signs and symptoms prior to discharge       11/04/18 1311   Final Note   Assessment Type Final Discharge Note   Anticipated Discharge Disposition Home   What phone number can be called within the next 1-3 days to see how you are doing after discharge? [phone]   Hospital Follow Up  Appt(s) scheduled? No  (Offices closed for weekend. Patient to schedule own follow up appointment.)   Right Care Referral Info   Post Acute Recommendation No Care     Alina Blanco RN Transitional Navigator  [phone]

## 2018-11-05 NOTE — OP NOTE
DATE OF PROCEDURE:  11/04/2018.    PREOPERATIVE DIAGNOSIS:  Biliary dyskinesia.    POSTOPERATIVE DIAGNOSES:  1.  Acute cholecystitis.  2.  Biliary dyskinesia with a history of gastroesophageal reflux disease.    PROCEDURES DONE:  Laparoscopic cholecystectomy.    SURGEON:  Jaimie Gutiérrez M.D.    SENIOR RESIDENT:  Dr. Elías Lay.    BLOOD LOSS:  Minimal.    The patient is stable, transferred to Recovery Room in stable condition without   any complication.    HISTORY AND INDICATION:  This is a 58-year-old gentleman who is visiting his son   from [place] started to have right upper quadrant pain for which he came to   the Emergency Room.  He is to have a history of right upper quadrant pain.  He   has had a complete workup.  He did not have any stones in the gallbladder;   however, he was found to have ejection fraction less than 10%.  The patient   started to have significant symptoms in [place].  Therefore, he came to the   Emergency Room.  He was examined and was admitted.  His LFTs showed that his   bilirubin is slightly elevated, but his ALT and AST was normal.  Ultrasound was   otherwise insignificant.    I had a long discussion with him about his condition.  I told him how to go by   his verbal report of his diagnosis of biliary dyskinesia with ejection fraction   less than 10%.  I told him people less than 30% of ejection fraction may benefit   from surgery; however, pain is not relieved in 100% of the people undergoing   surgery for this problem.  Since he has had a significant right upper quadrant   reproducible pain on eating, he may be better, but however, biliary dyskinesia   it is a variable effect after surgery.  I clearly told him that he cannot wait   to have those reports faxed and have the surgery done, but he was insistent that   he is confident about the HIDA scan report and he would like to proceed with   surgery.  I also told him that he need not have surgery while in [facility]  [place].    It can be taken conservatively and he can have the surgery done then.  His home   town in [place] seen by his surgeon.  However, he insisted since he is visiting   his son and since he is symptomatic, he would like to have surgery done.  I   then went over with him the risk and benefits of the surgery.  The risk of   bleeding, infection, DVT, PE, MI, stroke, conversion to open incisional hernia,   the chance of bile duct injury requiring more procedures and relapse of the pain   after surgery.  After going over all the risk and benefits, consent was   obtained.    FINDINGS:  The patient had a mildly inflamed gallbladder.    PROCEDURE IN DETAIL:  The patient was brought to the Operating Room after making   sure he has voided, he was placed in supine position.  He was then sedated and   intubated.  The abdomen was prepped and draped in the usual sterile manner.    Timeout was done to verify the ID of the patient and procedure and everyone   concurred.    A small incision was made at the supraumbilical groove.  Veress needle was   inserted into the abdominal cavity under direct vision and was insufflated with   CO2.  After adequate insufflation, a 5-mm Optiview trocar was inserted into the   abdominal cavity under direct vision.  Under direct vision, an 11 mm trocar in   the epigastric area and two 5-mm ports in the right upper quadrants were placed   under direct vision.  The patient was then placed in the reverse Trendelenburg   position with the right side elevated.  The gallbladder was found to be mildly   tense and inflamed.  The fundus was retracted superiorly into the right side.    The Nikki's pouch was retracted superiorly into the right side.  The cystic   duct and cystic artery was well dissected and isolated.  The critical view was   appreciated.  The cystic duct was clipped proximally x2 and distally once and   was transected in between.  The cystic artery was clipped x2 proximally and  once   distally and was transected in between.  The gallbladder was safely dissected   off from the liver bed and was placed in an Endobag and was retrieved outside.    The right upper quadrant was irrigated with lots of antibiotic solution and was   completely drained.  Meticulous hemostasis was accomplished all around.  The   ports were all withdrawn.  The epigastric fascial incision was approximated with   a figure-of-eight 0 Vicryl stitch.  Marcaine and Exparel was injected at all   the incisional area.  Skin was closed using 4-0 Monocryl and Dermabond was   applied over the skin area.  The patient was stable, was extubated, taken to the   Recovery Room in stable condition.      ADRIENNE  dd: 11/04/2018 13:04:24 (CST)  td: 11/04/2018 13:43:05 (CST)  Doc ID   #[id number]  Job ID #[id number]    CC:

## 2018-11-05 NOTE — PROGRESS NOTES
Patient voided.  Patient is being discharged.  Wife is at bedside and will accompany patient home.  To the Exit per wheelchair per Nursing staff.  Voiced no other concerns.

## 2018-11-08 NOTE — DISCHARGE SUMMARY
[facility]/General Surgery  Discharge Summary    Admit Date:   11/4/2018    Discharge Date:   11/4/18    Attending Physician:   Marla Geiger MD    Consults:   None    Procedures Performed:   Laparoscopic cholecystectomy    Admission HPI:   Brandt Sanches is a 58 y.o. male with a PMH of DM, HTN, GERD, and reported biliary dyskinesia with recent HIDA showing gallbladder EF <10% who presents to the ED with worsening RUQ pain and nausea. The patient states that the pain has been present for the past few months, but has worsened over the past week, becoming unbearable overnight. He denies fevers, chills, chest pain, SOB, constipation or blood in the urine or stool but does report some nausea and occasional diarrhea. His pain is located in the epigastric region and RUQ with radiation around to the back and is dull achy becoming sharp and stabbing after ingesting food with resolution about an hour later. The pain is otherwise intermittent with no other precipitating or relieving factors. He is on a PPI for his GERD with minimal resolution of his pain after GI cocktail in the ER. US and lab evaluation notable only for mild hyperbilirubinemia. Surgery consulted for further evaluation.     Hospital Course:   The patient was taken that day to the operating room for a laparoscopic cholecystectomy which he tolerated well without complication. He progressed well postoperatively and was stable for discharge later that day with outpatient follow-up arranged.      Final Diagnoses:  Active Hospital Problems    Diagnosis  POA    *Biliary dyskinesia [K82.8]  Yes    Type 2 diabetes mellitus without complication, with long-term current use of insulin [E11.9, Z79.4]  Not Applicable    Biliary colic [K80.50]  Yes    Gastroesophageal reflux disease without esophagitis [K21.9]  Yes    Essential hypertension [I10]  Yes    Hyperbilirubinemia [E80.6]  Yes    Sinus bradycardia [R00.1]  Yes      Resolved Hospital  Problems    Diagnosis Date Resolved POA    RUQ abdominal pain [R10.11] 11/04/2018 Yes     Discharged Condition:   Stable    Disposition:    Home or Self Care    Discharge Instructions:   Discharge Procedure Orders   Activity as tolerated   Scheduling Instructions: No lifting more than 10 lbs for 3 weeks  Can shower can climb stairs  No driving for one week can travel    Take pepcid for heart burn  miralax for constipation     Follow-up Information     Marla Geiger MD In 4 weeks.    Specialty:  General Surgery  Why:  Offices closed for weekend. Patient to schedule own follow up appointment.  Contact information:  [address]  [address]  [place] [address]  [phone]                 Discharge Medication List as of 11/4/2018  5:17 PM      START taking these medications    Details   ondansetron (ZOFRAN-ODT) 4 MG TbDL Take 1 tablet (4 mg total) by mouth every 8 (eight) hours as needed (nausea)., Starting Sun 11/4/2018, Print      oxyCODONE-acetaminophen (PERCOCET) 5-325 mg per tablet Take 1 tablet by mouth every 4 (four) hours as needed for Pain., Starting Sun 11/4/2018, Print         CONTINUE these medications which have NOT CHANGED    Details   canagliflozin (INVOKANA) 300 mg Tab tablet Take 300 mg by mouth once daily., Historical Med      glipiZIDE (GLUCOTROL) 10 MG tablet Take 10 mg by mouth 2 (two) times daily before meals., Historical Med      insulin glargine,hum.rec.anlog (LANTUS U-100 INSULIN SUBQ) Inject into the skin., Historical Med      lisinopril (PRINIVIL,ZESTRIL) 40 MG tablet Take 40 mg by mouth once daily., Historical Med      metFORMIN (GLUCOPHAGE) 500 MG tablet Take 500 mg by mouth daily with breakfast. , Historical Med      nebivolol (BYSTOLIC) 10 MG Tab Take 10 mg by mouth once daily., Historical Med      omeprazole (PRILOSEC) 40 MG capsule Take 40 mg by mouth once daily., Historical Med      simvastatin (ZOCOR) 20 MG tablet Take 20 mg by mouth every evening., Historical Med       sucralfate (CARAFATE) 1 gram tablet Take 1 g by mouth 4 (four) times daily., Historical Med             Mathieu Lay MD  [facility] General Surgery PGY-2

## 2019-08-01 NOTE — PLAN OF CARE
Called [facility] and they were able to add on the test.    Reported out to Sally rodriguez, charge, vss, no issues

## (undated) DEVICE — APPLIER CLIP ENDO MED/LG 10MM

## (undated) DEVICE — MANIFOLD 4 PORT

## (undated) DEVICE — SCISSOR 5MMX35CM DIRECT DRIVE

## (undated) DEVICE — SUT MCRYL PLUS 4-0 PS2 27IN

## (undated) DEVICE — SUT 0 VICRYL / UR6 (J603)

## (undated) DEVICE — ELECTRODE REM PLYHSV RETURN 9

## (undated) DEVICE — DRAPE INCISE IOBAN 2 23X23IN

## (undated) DEVICE — GLOVE SURG BIOGEL LATEX SZ 7.5

## (undated) DEVICE — IRRIGATOR ENDOSCOPY DISP.

## (undated) DEVICE — BAG TISS RETRV MONARCH 10MM

## (undated) DEVICE — ADHESIVE MASTISOL VIAL 48/BX

## (undated) DEVICE — NDL HYPDRM 23X15

## (undated) DEVICE — NDL INSUF ULTRA VERESS 120MM

## (undated) DEVICE — TROCAR KII FIOS 5MM X 100MM

## (undated) DEVICE — TROCAR KII FIOS 11MM X 100MM

## (undated) DEVICE — DISSECTOR 5MM ENDOPATH

## (undated) DEVICE — SOL IRR NACL .9% 3000ML

## (undated) DEVICE — SEE MEDLINE ITEM 156952